# Patient Record
Sex: FEMALE
[De-identification: names, ages, dates, MRNs, and addresses within clinical notes are randomized per-mention and may not be internally consistent; named-entity substitution may affect disease eponyms.]

---

## 2022-03-16 PROBLEM — Z00.00 ENCOUNTER FOR PREVENTIVE HEALTH EXAMINATION: Status: ACTIVE | Noted: 2022-03-16

## 2022-03-18 ENCOUNTER — APPOINTMENT (OUTPATIENT)
Dept: ORTHOPEDIC SURGERY | Facility: CLINIC | Age: 62
End: 2022-03-18
Payer: MEDICAID

## 2022-03-18 VITALS
HEIGHT: 65 IN | HEART RATE: 74 BPM | WEIGHT: 180 LBS | DIASTOLIC BLOOD PRESSURE: 71 MMHG | BODY MASS INDEX: 29.99 KG/M2 | SYSTOLIC BLOOD PRESSURE: 119 MMHG

## 2022-03-18 PROCEDURE — 99204 OFFICE O/P NEW MOD 45 MIN: CPT

## 2022-03-18 PROCEDURE — 73564 X-RAY EXAM KNEE 4 OR MORE: CPT | Mod: RT

## 2022-03-18 NOTE — HISTORY OF PRESENT ILLNESS
[de-identified] : 61 year old female presents for evaluation of bilateral knee pain Left > Right for the past 5-6 years. The pain was mild and gradually worsening. She has seen her PCP who referred her to pain management and had an MRI of both knees. She has taken meloxicam and diclofenac with partial relief. Previously using joint health supplements which she felt was helpful. The pain is of the medial aspect of her knees and worst when getting up from prolonged inactivity. She cannot kneel or bend low. She avoids stairs altogether. \par \par History notable for HTN

## 2022-03-18 NOTE — DISCUSSION/SUMMARY
[de-identified] : The patient has osteoarthritis of both knees.  She has been sent for MRIs which demonstrate tricompartmental osteoarthritis and degenerative meniscal tears.  I do not think there would be any benefit from arthroscopic surgery in the setting of tricompartmental arthritis.  The only surgical procedure appropriate would be total knee replacement.  I do not think that the patient is at the point where she requires arthroplasty.  She has been taking diclofenac 50 mg without appropriate pain relief.  She did have much better relief taking 75 mg twice daily when she was in [place].  Treatment options were discussed in detail.  The patient and her family are interested in injection therapy.  I discussed steroid injection versus hyaluronic acid.  They think they will be interested in hyaluronic acid but want to check with primary care physician first.  If they decide they want to proceed we will obtain authorization for hyaluronic acid injection.

## 2022-03-18 NOTE — PHYSICAL EXAM
[LE] : Sensory: Intact in bilateral lower extremities [DP] : dorsalis pedis 2+ and symmetric bilaterally [PT] : posterior tibial 2+ and symmetric bilaterally [Normal] : Alert and in no acute distress [Poor Appearance] : well-appearing [Acute Distress] : not in acute distress [Obese] : not obese [de-identified] : The patient has no respiratory distress. Mood and affect are normal. The patient is alert and oriented to person, place and time.\par There is no pain with active or passive motion of the hips.  There is no tenderness of either hip.  Examination of the knees demonstrates bilateral varus deformity.  Range of motion 0 to 125 degrees bilaterally.  There is no collateral or cruciate ligament instability.  There is medial tenderness bilaterally.  The calves are soft and nontender.  The skin is intact.  There is no lymphedema. [de-identified] : AP, lateral, tunnel and sunrise x-rays of both knees taken today demonstrate bilateral moderate tricompartmental osteoarthritis with varus deformity.

## 2022-03-23 RX ORDER — HYALURONATE SODIUM 20 MG/2 ML
20 SYRINGE (ML) INTRAARTICULAR
Qty: 3 | Refills: 1 | Status: ACTIVE | COMMUNITY
Start: 2022-03-23

## 2022-03-29 ENCOUNTER — APPOINTMENT (OUTPATIENT)
Dept: ORTHOPEDIC SURGERY | Facility: CLINIC | Age: 62
End: 2022-03-29
Payer: MEDICAID

## 2022-03-29 DIAGNOSIS — M17.0 BILATERAL PRIMARY OSTEOARTHRITIS OF KNEE: ICD-10-CM

## 2022-03-29 PROCEDURE — 99213 OFFICE O/P EST LOW 20 MIN: CPT | Mod: 25

## 2022-03-29 PROCEDURE — 20610 DRAIN/INJ JOINT/BURSA W/O US: CPT | Mod: LT

## 2022-03-29 NOTE — HISTORY OF PRESENT ILLNESS
[de-identified] : 61 year old female returns today for evaluation of bilateral knee pain Left > Right for the past 5-6 years. The patient says the pain is still mild and gradually worsening especially around the medial aspect of her knees. She is currently taking diclofenac with partial relief. She still has difficulty kneeling or bending low.  She is here today to see if she can receive cortisone injections to both of her knees.  The insurance company would not approve viscosupplementation.

## 2022-03-29 NOTE — DISCUSSION/SUMMARY
[de-identified] : The insurance company would not approve viscosupplementation.  Treatment options were discussed in detail.  The patient would like to try steroid injections.\par Informed consent was obtained. Site and procedure were confirmed with the patient. Following a sterile prep the right knee was aspirated but no fluid was returned. 1 cc of Depo-Medrol and 4 cc of 1% Xylocaine were injected in the right knee without complication. Sterile dressing was applied. Instructions were given.\par Informed consent was obtained. Site and procedure were confirmed with the patient. Following a sterile prep the left knee was aspirated but no fluid was returned. 1 cc of Depo-Medrol and 4 cc of 1% Xylocaine was injected in the left knee without complication. Sterile dressing was applied. Instructions were given.\par She will return as needed.

## 2022-03-29 NOTE — PHYSICAL EXAM
[LE] : Sensory: Intact in bilateral lower extremities [DP] : dorsalis pedis 2+ and symmetric bilaterally [PT] : posterior tibial 2+ and symmetric bilaterally [Normal] : Alert and in no acute distress [Poor Appearance] : well-appearing [Acute Distress] : not in acute distress [Obese] : not obese [de-identified] : The patient has no respiratory distress. Mood and affect are normal. The patient is alert and oriented to person, place and time.\par There is no pain with active or passive motion of the hips.  There is no tenderness of either hip.  Examination of the knees demonstrates bilateral varus deformity.  Range of motion 0 to 125 degrees bilaterally.  There is no collateral or cruciate ligament instability.  There is medial tenderness bilaterally.  The calves are soft and nontender.  The skin is intact.  There is no lymphedema.

## 2022-04-13 ENCOUNTER — APPOINTMENT (OUTPATIENT)
Dept: ORTHOPEDIC SURGERY | Facility: HOSPITAL | Age: 62
End: 2022-04-13

## 2022-09-13 ENCOUNTER — RX RENEWAL (OUTPATIENT)
Age: 62
End: 2022-09-13

## 2024-03-28 ENCOUNTER — APPOINTMENT (OUTPATIENT)
Dept: GASTROENTEROLOGY | Facility: CLINIC | Age: 64
End: 2024-03-28
Payer: COMMERCIAL

## 2024-03-28 VITALS
HEIGHT: 65 IN | RESPIRATION RATE: 16 BRPM | WEIGHT: 175 LBS | OXYGEN SATURATION: 96 % | SYSTOLIC BLOOD PRESSURE: 167 MMHG | DIASTOLIC BLOOD PRESSURE: 96 MMHG | HEART RATE: 75 BPM | TEMPERATURE: 98.2 F | BODY MASS INDEX: 29.16 KG/M2

## 2024-03-28 DIAGNOSIS — Z12.12 ENCOUNTER FOR SCREENING FOR MALIGNANT NEOPLASM OF COLON: ICD-10-CM

## 2024-03-28 DIAGNOSIS — Z78.9 OTHER SPECIFIED HEALTH STATUS: ICD-10-CM

## 2024-03-28 DIAGNOSIS — I10 ESSENTIAL (PRIMARY) HYPERTENSION: ICD-10-CM

## 2024-03-28 DIAGNOSIS — Z12.11 ENCOUNTER FOR SCREENING FOR MALIGNANT NEOPLASM OF COLON: ICD-10-CM

## 2024-03-28 PROCEDURE — 99204 OFFICE O/P NEW MOD 45 MIN: CPT

## 2024-03-28 RX ORDER — ALBUTEROL SULFATE 2.5 MG/3ML
(2.5 MG/3ML) SOLUTION RESPIRATORY (INHALATION)
Refills: 0 | Status: ACTIVE | COMMUNITY

## 2024-03-28 RX ORDER — DOCUSATE SODIUM 100 MG/1
100 CAPSULE, LIQUID FILLED ORAL TWICE DAILY
Qty: 60 | Refills: 5 | Status: ACTIVE | COMMUNITY
Start: 2024-03-28 | End: 1900-01-01

## 2024-03-28 RX ORDER — OMEPRAZOLE 20 MG/1
20 CAPSULE, DELAYED RELEASE ORAL
Refills: 0 | Status: ACTIVE | COMMUNITY

## 2024-03-28 RX ORDER — POLYETHYLENE GLYCOL 3350 17 G/17G
17 POWDER, FOR SOLUTION ORAL DAILY
Qty: 1 | Refills: 3 | Status: ACTIVE | COMMUNITY
Start: 2024-03-28 | End: 1900-01-01

## 2024-03-28 RX ORDER — MONTELUKAST SODIUM 10 MG/1
10 TABLET, FILM COATED ORAL
Refills: 0 | Status: ACTIVE | COMMUNITY

## 2024-03-28 RX ORDER — AMLODIPINE BESYLATE 5 MG/1
5 TABLET ORAL
Refills: 0 | Status: ACTIVE | COMMUNITY

## 2024-03-28 RX ORDER — DICLOFENAC SODIUM 75 MG/1
75 TABLET, DELAYED RELEASE ORAL
Refills: 0 | Status: ACTIVE | COMMUNITY

## 2024-03-28 NOTE — HISTORY OF PRESENT ILLNESS
[FreeTextEntry1] : Patient's son acted as he and the . 63-year-old woman complains of approximately 1 year of abdominal pain and bloating.  This is associated with chronic constipation.  She denies rectal bleeding, melena or hematemesis.  She has never undergone a screening colonoscopy.  She underwent an abdominal sonogram on February 23 that was negative and confirm that she is status post cholecystectomy.  She has a history of hypertension but is otherwise in good health.

## 2024-03-28 NOTE — REVIEW OF SYSTEMS
[As Noted in HPI] : as noted in HPI [Vomiting] : no vomiting [Abdominal Pain] : abdominal pain [Constipation] : constipation [Heartburn] : heartburn [Diarrhea] : no diarrhea [Melena (black stool)] : no melena [Bleeding] : no bleeding [Fecal Incontinence (soiling)] : no fecal incontinence [Bloating (gassiness)] : bloating [Negative] : Heme/Lymph

## 2024-03-28 NOTE — PHYSICAL EXAM
[Normal Voice/Communication] : normal voice/communication [Alert] : alert [No Acute Distress] : no acute distress [Healthy Appearing] : healthy appearing [Sclera] : the sclera and conjunctiva were normal [Hearing Threshold Finger Rub Not [place]] : hearing was normal [Normal Lips/Gums] : the lips and gums were normal [Oropharynx] : the oropharynx was normal [Normal Appearance] : the appearance of the neck was normal [No Neck Mass] : no neck mass was observed [No Respiratory Distress] : no respiratory distress [No Acc Muscle Use] : no accessory muscle use [Auscultation Breath Sounds / Voice Sounds] : lungs were clear to auscultation bilaterally [Respiration, Rhythm And Depth] : normal respiratory rhythm and effort [Heart Rate And Rhythm] : heart rate was normal and rhythm regular [Normal S1, S2] : normal S1 and S2 [Murmurs] : no murmurs [None] : no edema [Bowel Sounds] : normal bowel sounds [Abdomen Tenderness] : non-tender [No Masses] : no abdominal mass palpated [Abdomen Soft] : soft [] : no hepatosplenomegaly [Cervical Lymph Nodes Enlarged Posterior Bilaterally] : no posterior cervical lymphadenopathy [Supraclavicular Lymph Nodes Enlarged Bilaterally] : no supraclavicular lymphadenopathy [No CVA Tenderness] : no CVA  tenderness [Abnormal Walk] : normal gait [Normal] : oriented to person, place, and time [Motor Exam] : the motor exam was normal [Oriented To Time, Place, And Person] : oriented to person, place, and time [Normal Affect] : the affect was normal [Normal Mood] : the mood was normal

## 2024-03-28 NOTE — ASSESSMENT
[FreeTextEntry1] : Irritable bowel syndrome with constipation. Dietary and lifestyle modification discussed with the patient.  Patient will start on daily MiraLAX and a stool softener.  Screening colonoscopy was discussed with the patient and her son.  The importance of colon cancer screening on the colonoscopy prep was reviewed.  She understands and all questions were answered. GERD. Dietary and lifestyle modification discussed with the patient.  Continue omeprazole.  EGD and biopsy to be scheduled.

## 2024-04-17 LAB — HEMOCCULT STL QL IA: NEGATIVE

## 2024-05-16 ENCOUNTER — APPOINTMENT (OUTPATIENT)
Dept: GASTROENTEROLOGY | Facility: AMBULATORY MEDICAL SERVICES | Age: 64
End: 2024-05-16
Payer: COMMERCIAL

## 2024-05-16 PROCEDURE — 45380 COLONOSCOPY AND BIOPSY: CPT | Mod: 33

## 2024-05-16 PROCEDURE — 43239 EGD BIOPSY SINGLE/MULTIPLE: CPT | Mod: 59

## 2024-05-31 ENCOUNTER — APPOINTMENT (OUTPATIENT)
Dept: GASTROENTEROLOGY | Facility: CLINIC | Age: 64
End: 2024-05-31
Payer: COMMERCIAL

## 2024-05-31 VITALS
HEART RATE: 90 BPM | DIASTOLIC BLOOD PRESSURE: 84 MMHG | SYSTOLIC BLOOD PRESSURE: 156 MMHG | HEIGHT: 65 IN | WEIGHT: 172 LBS | TEMPERATURE: 98.2 F | OXYGEN SATURATION: 96 % | BODY MASS INDEX: 28.66 KG/M2

## 2024-05-31 DIAGNOSIS — K59.00 CONSTIPATION, UNSPECIFIED: ICD-10-CM

## 2024-05-31 DIAGNOSIS — R14.1 GAS PAIN: ICD-10-CM

## 2024-05-31 DIAGNOSIS — K21.9 GASTRO-ESOPHAGEAL REFLUX DISEASE W/OUT ESOPHAGITIS: ICD-10-CM

## 2024-05-31 PROCEDURE — 99214 OFFICE O/P EST MOD 30 MIN: CPT

## 2024-05-31 RX ORDER — POLYETHYLENE GLYCOL 3350, SODIUM CHLORIDE, SODIUM BICARBONATE AND POTASSIUM CHLORIDE WITH LEMON FLAVOR 420; 11.2; 5.72; 1.48 G/4L; G/4L; G/4L; G/4L
420 POWDER, FOR SOLUTION ORAL
Qty: 4000 | Refills: 0 | Status: DISCONTINUED | COMMUNITY
Start: 2024-03-28 | End: 2024-05-31

## 2024-05-31 RX ORDER — FAMOTIDINE 20 MG/1
20 TABLET, FILM COATED ORAL TWICE DAILY
Qty: 60 | Refills: 4 | Status: ACTIVE | COMMUNITY
Start: 2024-05-31 | End: 1900-01-01

## 2024-05-31 NOTE — HISTORY OF PRESENT ILLNESS
[FreeTextEntry1] : 64-year-old woman with constipation.  According to son she has responded well to MiraLAX daily.  She has not been taking the stool softener.  She also continues to complain of acid reflux and has not been on any medication.  She underwent an EGD and biopsy and screening colonoscopy on May 16 that was unremarkable.  FIT test is negative.

## 2024-05-31 NOTE — ASSESSMENT
[FreeTextEntry1] : Irritable bowel syndrome with constipation. Dietary and lifestyle modification discussed with the patient.  Continue MiraLAX 2-3 times a week.  Add stool softener if necessary. Acid reflux. Dietary and lifestyle modification discussed with the patient.  Famotidine 20 mg twice daily.

## 2024-05-31 NOTE — REASON FOR VISIT
[Follow-up] : a follow-up of an existing diagnosis [Ad Hoc ] : provided by an ad hoc  [FreeTextEntry1] : constipation [Interpreters_FullName] : Jayant Phillips [Interpreters_Relationshiptopatient] : son [TWNoteComboBox1] : Alexandra

## 2024-08-22 ENCOUNTER — APPOINTMENT (OUTPATIENT)
Dept: GASTROENTEROLOGY | Facility: CLINIC | Age: 64
End: 2024-08-22

## 2025-07-07 ENCOUNTER — NON-APPOINTMENT (OUTPATIENT)
Age: 65
End: 2025-07-07

## 2025-07-09 ENCOUNTER — APPOINTMENT (OUTPATIENT)
Dept: GASTROENTEROLOGY | Facility: CLINIC | Age: 65
End: 2025-07-09
Payer: COMMERCIAL

## 2025-07-09 VITALS
OXYGEN SATURATION: 95 % | HEART RATE: 77 BPM | DIASTOLIC BLOOD PRESSURE: 85 MMHG | HEIGHT: 65 IN | WEIGHT: 180 LBS | SYSTOLIC BLOOD PRESSURE: 148 MMHG | BODY MASS INDEX: 29.99 KG/M2

## 2025-07-09 PROCEDURE — 99213 OFFICE O/P EST LOW 20 MIN: CPT

## 2025-07-09 PROCEDURE — G2211 COMPLEX E/M VISIT ADD ON: CPT | Mod: NC
